# Patient Record
Sex: FEMALE | Race: OTHER
[De-identification: names, ages, dates, MRNs, and addresses within clinical notes are randomized per-mention and may not be internally consistent; named-entity substitution may affect disease eponyms.]

---

## 2019-06-09 ENCOUNTER — HOSPITAL ENCOUNTER (EMERGENCY)
Dept: HOSPITAL 89 - ER | Age: 24
Discharge: HOME | End: 2019-06-09
Payer: COMMERCIAL

## 2019-06-09 VITALS — DIASTOLIC BLOOD PRESSURE: 71 MMHG | SYSTOLIC BLOOD PRESSURE: 107 MMHG

## 2019-06-09 DIAGNOSIS — T78.40XA: Primary | ICD-10-CM

## 2019-06-09 PROCEDURE — 99284 EMERGENCY DEPT VISIT MOD MDM: CPT

## 2019-06-09 PROCEDURE — 96365 THER/PROPH/DIAG IV INF INIT: CPT

## 2019-06-09 PROCEDURE — 96375 TX/PRO/DX INJ NEW DRUG ADDON: CPT

## 2019-06-09 PROCEDURE — 96361 HYDRATE IV INFUSION ADD-ON: CPT

## 2019-06-09 NOTE — ER REPORT
History and Physical


Time Seen By MD:  20:06


Hx. of Stated Complaint:  


facial swelling starting 1 hour ago. feels like throat is also swollen. pt took 


benadryl


HPI/ROS


CHIEF COMPLAINT: allergic reaction





HISTORY OF PRESENT ILLNESS: Patient presents to ED after allergic reaction. She 


reports swollen eye lids that started to develop around 1800 this evening. 


Reports she took a benadryl at 1845. She then started to develop hives on her 


arms and abdomen with swelling in the cheeks. She then reported she came to the 


ED. She reports she has not eaten any new food today, used new shampoo or body 


wash, no new detergent, no new makeup.





REVIEW OF SYSTEMS:


Constitutional: No fevers.


HENT: Reports feeling of swollen throat. No swelling of tongue or lips. No 


vision changes. No headache. No dizziness. 


Respiratory: No cough, no dyspnea.


Cardiovascular: No chest pain, no palpitations.


Gastrointestinal: No vomiting, no abdominal pain.


Musculoskeletal: No back pain.


Allergies:  


Coded Allergies:  


     gluten (Verified  Allergy, Severe, 6/9/19)


Home Meds


Active Scripts


Prednisone (PREDNISONE) 20 Mg Tablet, 40 MG PO BID for 4 Days, #8 TAB


   Prov:HARRISON FORD F[facility]         6/9/19


Past Medical/Surgical History


Past medical hx of celiac disease.


Past surgical hx of wisdom teeth removal.


Reviewed Nurses Notes:  Yes


Constitutional





Vital Sign - Last 24 Hours








 6/9/19 6/9/19 6/9/19 6/9/19





 19:55 19:59 19:59 20:00


 


Temp  99.1  


 


Pulse ??? 120  


 


Resp  20  


 


B/P (MAP)  154/95 154/95 (114) 130/89 (103)


 


Pulse Ox  97  


 


O2 Delivery  Room Air  


 


    





 6/9/19 6/9/19 6/9/19 6/9/19





 20:25 20:55 21:00 21:25


 


Pulse 110 105  91


 


B/P (MAP)   114/64 (81) 


 


Pulse Ox 95 91  98





 6/9/19   





 21:30   


 


B/P (MAP) 107/71 (83)   








Physical Exam


General Appearance: The patient is alert, has no immediate need for airway 


protection and no current signs of toxicity.  


Eyes: Pupils equal and round no injection. Eye lids swollen.


HENT: Cheeks appear swollen. Lips appear swollen. 


Respiratory: Chest is non tender, lungs are clear to auscultation.


Cardiac: regular rate and rhythm 


Gastrointestinal: Abdomen is soft and non tender, no masses, bowel sounds 


normal.


Musculoskeletal:  Neck: Neck is supple and non tender.


   Extremities have full range of motion and are non tender.


Skin: Hives located in antecubital area of bilateral arms and anterior aspect of


abdomen. 





DIFFERENTIAL DIAGNOSIS: After history and physical exam differential diagnosis 


was considered for allergic reaction.





Medical Decision Making


ED Course/Re-evaluation


ED Course


Upon arrival to the ED patient admitted to an exam room, hx and physical 


obtained, differentials considered. Patient presents to ED after allergic 


reaction. She reports swollen eye lids that started to develop around 1800 this 


evening. Reports she took a benadryl at 1845. She then started to develop hives 


on her arms and abdomen with swelling in the cheeks. She then reported she came 


to the ED. She reports she has not eaten any new food today, used new shampoo or


body wash, no new detergent, no new makeup. On further questioning, patient also


reports a swollen feeling in her throat. Denies swelling of her tongue or lips. 


Denies SOB, chest tightness, or any difficulty breathing. Denies On exam, heart 


rate tachy, regular rhythm, lungs clear to auscultation. IV started. 25mg 


benadryl, 125mg solumedrol, and 20mg pepcid given IV. After about 45 minutes, 


patient reports her throat no longer feels swollen and her eye lid swelling is 


decreasing. Hives are also getting better. Will send patient home with 1 dose of


oral prednisone for tonight, and will give her a prescription for four more days


of prednisone. She should follow-up with her PCP next week. Patient agrees with 


plan of care.


Decision to Disposition Date:  Jun 9, 2019


Decision to Disposition Time:  21:32





Depart


Departure


Latest Vital Signs





Vital Signs








  Date Time  Temp Pulse Resp B/P (MAP) Pulse Ox O2 Delivery O2 Flow Rate FiO2


 


6/9/19 21:30    107/71 (83)    


 


6/9/19 21:25  91   98   


 


6/9/19 19:59 99.1  20   Room Air  








Impression:  


   Primary Impression:  


   Allergic reaction


Condition:  Improved


Disposition:  HOME OR SELF-CARE


New Scripts


Prednisone (PREDNISONE) 20 Mg Tablet


40 MG PO BID for 4 Days, #8 TAB


   Prov: HARRISON FORD         6/9/19


Patient Instructions:  General Allergic Reaction (ED)





Additional Instructions:  


Please drink plenty of water and get plenty of rest.


Please take steroids as prescribed.


Follow-up with your primary care provider this next week.


Return to the ER if your swelling returns, if you have any difficulty breathing,


you have chest pain, or for any other concern.





Problem Qualifiers








   Primary Impression:  


   Allergic reaction


   Encounter type:  initial encounter  Qualified Codes:  T78.40XA - Allergy, 


   unspecified, initial encounter








AHRRISON FORD                 Jun 9, 2019 20:06